# Patient Record
Sex: FEMALE | Race: WHITE | Employment: PART TIME | URBAN - METROPOLITAN AREA
[De-identification: names, ages, dates, MRNs, and addresses within clinical notes are randomized per-mention and may not be internally consistent; named-entity substitution may affect disease eponyms.]

---

## 2018-06-24 PROBLEM — R10.31 ABDOMINAL PAIN, ACUTE, RIGHT LOWER QUADRANT: Status: ACTIVE | Noted: 2018-06-24

## 2018-11-01 ENCOUNTER — OFFICE VISIT (OUTPATIENT)
Dept: ORTHOPEDIC SURGERY | Facility: CLINIC | Age: 33
End: 2018-11-01

## 2018-11-01 VITALS
WEIGHT: 128.2 LBS | SYSTOLIC BLOOD PRESSURE: 103 MMHG | TEMPERATURE: 97.3 F | DIASTOLIC BLOOD PRESSURE: 56 MMHG | HEIGHT: 65 IN | BODY MASS INDEX: 21.36 KG/M2 | HEART RATE: 78 BPM | RESPIRATION RATE: 18 BRPM | OXYGEN SATURATION: 98 %

## 2018-11-01 DIAGNOSIS — M22.41 CHONDROMALACIA PATELLAE, RIGHT: Primary | ICD-10-CM

## 2018-11-01 DIAGNOSIS — M25.561 CHRONIC PAIN OF RIGHT KNEE: ICD-10-CM

## 2018-11-01 DIAGNOSIS — M17.11 PRIMARY OSTEOARTHRITIS OF RIGHT KNEE: ICD-10-CM

## 2018-11-01 DIAGNOSIS — G89.29 CHRONIC PAIN OF RIGHT KNEE: ICD-10-CM

## 2018-11-01 RX ORDER — BETAMETHASONE SODIUM PHOSPHATE AND BETAMETHASONE ACETATE 3; 3 MG/ML; MG/ML
6 INJECTION, SUSPENSION INTRA-ARTICULAR; INTRALESIONAL; INTRAMUSCULAR; SOFT TISSUE ONCE
Qty: 0.5 ML | Refills: 0
Start: 2018-11-01 | End: 2018-11-01

## 2018-11-01 RX ORDER — BUPIVACAINE HYDROCHLORIDE 2.5 MG/ML
4 INJECTION, SOLUTION EPIDURAL; INFILTRATION; INTRACAUDAL ONCE
Qty: 4 ML | Refills: 0
Start: 2018-11-01 | End: 2018-11-01

## 2018-11-01 NOTE — PROGRESS NOTES
Patient: Lollie Frankel                MRN: [id number]       SSN: [id number] YOB: 1985        AGE: 34 y.o. SEX: female PCP: None 11/01/18 Chief Complaint Patient presents with  Knee Pain Right HISTORY:  Lollie Frankel is a 34 y.o. female who is seen for right knee pain. She was a  for the [employer] for 6.5 years. She believes wear and tear while running jumping and carrying heavy equipment in the service initiated her pain. She reports progressive anterior knee pain since 2007. She states that she was seen by a [facility] orthopedist who treated her with heavy pain medications, cortisone injections, and PT. She reports limited right knee motion. She has some pain radiating through her right leg. She was seen by the [facility] by her PCP who she reports told her to suck it up and deal with the pain. She had a previous MRI scan in her right knee while in the service that showed CMP but no meniscal tear--report unavailable. She has tried a variety of conservative measures including cortisone injections, NSAIDs, and physical therapy. She reports startup pain. She has pain ascending/descending stairs. She reports she received temporary relief from previous visco supplementation. She reports a feeling of right leg weakness. Pain Assessment  11/1/2018 Location of Pain Knee Location Modifiers Right Severity of Pain 5 Quality of Pain Aching;Dull Duration of Pain Persistent Frequency of Pain Constant Aggravating Factors Walking;Standing Limiting Behavior Yes Relieving Factors Nothing Result of Injury No  
 
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 She lives in [place] with her , Angelica Wallace, and 2yo daughter. Current weight is 128 pounds. She is 5'5\" tall.    
 
No results found for: HBA1C, HGBE8, QSH7LSDU, LSB7CHIY, NQR1UMWF 
 Weight Metrics 11/1/2018 6/24/2018 3/25/2018 7/19/2016 6/12/2012 6/9/2012 Weight 128 lb 3.2 oz 120 lb 120 lb 144 lb 118 lb 118 lb BMI 21.33 kg/m2 19.97 kg/m2 19.97 kg/m2 23.96 kg/m2 19.33 kg/m2 19.33 kg/m2 Patient Active Problem List  
Diagnosis Code  Pregnant Z34.90  Abdominal pain, acute, right lower quadrant R10.31 REVIEW OF SYSTEMS: All Below are Negative except: See HPI Constitutional: negative for fever, chills, and weight loss. Cardiovascular: negative for chest pain, claudication, leg swelling, SOB, GAGE Gastrointestinal: Negative for pain, N/V/C/D, Blood in stool or urine, dysuria,  hematuria, incontinence, pelvic pain. Musculoskeletal: See HPI Neurological: Negative for dizziness and weakness. Negative for headaches, Visual changes, confusion, seizures Phychiatric/Behavioral: Negative for depression, memory loss, substance  abuse. Extremities: Negative for hair changes, rash, or skin lesion changes. Hematologic: Negative for bleeding problems, bruising, pallor or swollen lymph  nodes Peripheral Vascular: No calf pain, no circulation deficits. Social History Socioeconomic History  Marital status:  Spouse name: Not on file  Number of children: Not on file  Years of education: Not on file  Highest education level: Not on file Social Needs  Financial resource strain: Not on file  Food insecurity - worry: Not on file  Food insecurity - inability: Not on file  Transportation needs - medical: Not on file  Transportation needs - non-medical: Not on file Occupational History  Not on file Tobacco Use  Smoking status: Never Smoker  Smokeless tobacco: Never Used Substance and Sexual Activity  Alcohol use: Yes Comment: soically  Drug use: No  
 Sexual activity: Yes  
  Partners: Male Birth control/protection: None Other Topics Concern  Not on file Social History Narrative  Not on file No Known Allergies Current Outpatient Medications Medication Sig  
 ondansetron (ZOFRAN ODT) 4 mg disintegrating tablet Take 1 Tab by mouth every four (4) hours as needed for Nausea. No current facility-administered medications for this visit. PHYSICAL EXAMINATION: 
Visit Vitals /56 Pulse 78 Temp 97.3 °F (36.3 °C) (Oral) Resp 18 Ht 5' 5\" (1.651 m) Wt 128 lb 3.2 oz (58.2 kg) SpO2 98% BMI 21.33 kg/m² ORTHO EXAMINATION: 
Examination Right knee Left knee Skin Intact Intact Range of motion 115-0 120-0 Effusion - - Medial joint line tenderness + - Lateral joint line tenderness + - Popliteal tenderness - -  
Osteophytes palpable - - Rias - - Patella crepitus + - Anterior drawer - - Lateral laxity - - Medial laxity - - Varus deformity - -  
Valgus deformity - - Pretibial edema - - Calf tenderness - - PROCEDURE:  After discussing treatment options, patient's right knee was injected with 4 cc Marcaine and 1/2 cc Celestone. Chart reviewed for the following: 
 Johan Diaz MD, have reviewed the History, Physical and updated the Allergic reactions for [facility] TIME OUT performed immediately prior to start of procedure: 
Johan Diaz MD, have performed the following reviews on [facility] prior to the start of the procedure: 
         
* Patient was identified by name and date of birth * Agreement on procedure being performed was verified * Risks and Benefits explained to the patient * Procedure site verified and marked as necessary * Patient was positioned for comfort * Consent was obtained Time: 9:34 AM  
 
Date of procedure: 11/1/2018 Procedure performed by:  Dominga Foreman MD 
 
Ms. Yoo tolerated the procedure well with no complications. RADIOGRAPHS: 
XR RIGHT KNEE 11/1/18 DARYL IMPRESSION:  Three views - No fractures, no effusion, mild joint space narrowing, small osteophytes present. IKDC Grade B IMPRESSION:   
  ICD-10-CM ICD-9-CM 1. Chondromalacia patellae, right M22.41 717.7 PROCEDURE AUTHORIZATION TO   
   betamethasone (CELESTONE SOLUSPAN) 6 mg/mL injection BETAMETHASONE ACETATE & SODIUM PHOSPHATE INJECTION 3 MG EA.  
   DRAIN/INJECT LARGE JOINT/BURSA  
   bupivacaine, PF, (MARCAINE, PF,) 0.25 % (2.5 mg/mL) injection MRI KNEE RT WO CONT 2. Chronic pain of right knee M25.561 719.46 AMB POC X-RAY KNEE 3 VIEW  
 G89.29 338.29 PROCEDURE AUTHORIZATION TO   
   betamethasone (CELESTONE SOLUSPAN) 6 mg/mL injection BETAMETHASONE ACETATE & SODIUM PHOSPHATE INJECTION 3 MG EA.  
   DRAIN/INJECT LARGE JOINT/BURSA  
   bupivacaine, PF, (MARCAINE, PF,) 0.25 % (2.5 mg/mL) injection MRI KNEE RT WO CONT 3. Primary osteoarthritis of right knee M17.11 715.16 PROCEDURE AUTHORIZATION TO   
   betamethasone (CELESTONE SOLUSPAN) 6 mg/mL injection BETAMETHASONE ACETATE & SODIUM PHOSPHATE INJECTION 3 MG EA.  
   DRAIN/INJECT LARGE JOINT/BURSA  
   bupivacaine, PF, (MARCAINE, PF,) 0.25 % (2.5 mg/mL) injection MRI KNEE RT WO CONT PLAN:  After discussing treatment options, patient's right knee was injected with 4 cc Marcaine and 1/2 cc Celestone. Right knee MRI ordered. Consider visco supplementation if pain continues. She will follow up with the results of her MRI. There is no need for surgery at this time.   
 
Scribed by Katelyn Galarza ([address]) as dictated by Ludivina Marte MD

## 2018-11-23 ENCOUNTER — HOSPITAL ENCOUNTER (OUTPATIENT)
Dept: MRI IMAGING | Age: 33
Discharge: HOME | End: 2018-11-23
Attending: SPECIALIST
Payer: MEDICAID

## 2018-11-23 DIAGNOSIS — M25.561 CHRONIC PAIN OF RIGHT KNEE: ICD-10-CM

## 2018-11-23 DIAGNOSIS — M22.41 CHONDROMALACIA PATELLAE, RIGHT: ICD-10-CM

## 2018-11-23 DIAGNOSIS — G89.29 CHRONIC PAIN OF RIGHT KNEE: ICD-10-CM

## 2018-11-23 DIAGNOSIS — M17.11 PRIMARY OSTEOARTHRITIS OF RIGHT KNEE: ICD-10-CM

## 2018-11-23 PROCEDURE — 73721 MRI JNT OF LWR EXTRE W/O DYE: CPT

## 2019-09-12 PROBLEM — Z34.90 ENCOUNTER FOR ELECTIVE INDUCTION OF LABOR: Status: ACTIVE | Noted: 2019-09-12

## 2022-03-18 PROBLEM — Z34.90 ENCOUNTER FOR ELECTIVE INDUCTION OF LABOR: Status: ACTIVE | Noted: 2019-09-12

## 2022-03-20 PROBLEM — R10.31 ABDOMINAL PAIN, ACUTE, RIGHT LOWER QUADRANT: Status: ACTIVE | Noted: 2018-06-24

## 2022-12-13 ENCOUNTER — TELEPHONE (OUTPATIENT)
Dept: FAMILY MEDICINE CLINIC | Age: 37
End: 2022-12-13

## 2022-12-13 NOTE — TELEPHONE ENCOUNTER
----- Message from Woodson Curling sent at 12/12/2022  3:01 PM EST -----  Regarding: RE: Needs appt  Dr. Alexander Gallego is all booked up until April.   ----- Message -----  From: Sreekanth Rogel  Sent: 12/12/2022   2:52 PM EST  To: Braeden Carr  Subject: Needs appt                                       Pt lives in [place]. Wants someone to see her and her children. Message  Received: Today  Keke MEDINA [facility] Front Office  Subject: Appointment Request     Reason for Call: New Patient/New to Provider Appointment needed: New   Patient Request Appointment     QUESTIONS     Reason for appointment request? No appointments available during search       Additional Information for Provider?  Patient and her two children are   looking to establish care with a provider at the practice   ---------------------------------------------------------------------------   --------------   [id number]   [phone]; OK to leave message on voicemail   ---------------------------------------------------------------------------   --------------   SCRIPT ANSWERS   COVID Screen: Lulu Ellsworth

## 2023-02-22 ENCOUNTER — OFFICE VISIT (OUTPATIENT)
Facility: CLINIC | Age: 38
End: 2023-02-22
Payer: COMMERCIAL

## 2023-02-22 VITALS
HEIGHT: 65 IN | BODY MASS INDEX: 19.19 KG/M2 | DIASTOLIC BLOOD PRESSURE: 65 MMHG | HEART RATE: 70 BPM | OXYGEN SATURATION: 98 % | TEMPERATURE: 98.7 F | SYSTOLIC BLOOD PRESSURE: 95 MMHG | WEIGHT: 115.2 LBS

## 2023-02-22 DIAGNOSIS — Z90.710 S/P TOTAL HYSTERECTOMY: ICD-10-CM

## 2023-02-22 DIAGNOSIS — M17.11 PRIMARY OSTEOARTHRITIS OF RIGHT KNEE: ICD-10-CM

## 2023-02-22 DIAGNOSIS — Z00.00 HEALTHCARE MAINTENANCE: Primary | ICD-10-CM

## 2023-02-22 PROBLEM — R10.31 ABDOMINAL PAIN, ACUTE, RIGHT LOWER QUADRANT: Status: RESOLVED | Noted: 2018-06-24 | Resolved: 2023-02-22

## 2023-02-22 PROBLEM — Z34.90 ENCOUNTER FOR ELECTIVE INDUCTION OF LABOR: Status: RESOLVED | Noted: 2019-09-12 | Resolved: 2023-02-22

## 2023-02-22 PROCEDURE — 99395 PREV VISIT EST AGE 18-39: CPT | Performed by: STUDENT IN AN ORGANIZED HEALTH CARE EDUCATION/TRAINING PROGRAM

## 2023-02-22 SDOH — ECONOMIC STABILITY: FOOD INSECURITY: WITHIN THE PAST 12 MONTHS, YOU WORRIED THAT YOUR FOOD WOULD RUN OUT BEFORE YOU GOT MONEY TO BUY MORE.: NEVER TRUE

## 2023-02-22 SDOH — ECONOMIC STABILITY: INCOME INSECURITY: HOW HARD IS IT FOR YOU TO PAY FOR THE VERY BASICS LIKE FOOD, HOUSING, MEDICAL CARE, AND HEATING?: NOT HARD AT ALL

## 2023-02-22 SDOH — ECONOMIC STABILITY: HOUSING INSECURITY
IN THE LAST 12 MONTHS, WAS THERE A TIME WHEN YOU DID NOT HAVE A STEADY PLACE TO SLEEP OR SLEPT IN A SHELTER (INCLUDING NOW)?: NO

## 2023-02-22 SDOH — ECONOMIC STABILITY: FOOD INSECURITY: WITHIN THE PAST 12 MONTHS, THE FOOD YOU BOUGHT JUST DIDN'T LAST AND YOU DIDN'T HAVE MONEY TO GET MORE.: NEVER TRUE

## 2023-02-22 ASSESSMENT — PATIENT HEALTH QUESTIONNAIRE - PHQ9
SUM OF ALL RESPONSES TO PHQ9 QUESTIONS 1 & 2: 1
SUM OF ALL RESPONSES TO PHQ QUESTIONS 1-9: 1
1. LITTLE INTEREST OR PLEASURE IN DOING THINGS: 0
SUM OF ALL RESPONSES TO PHQ QUESTIONS 1-9: 1
2. FEELING DOWN, DEPRESSED OR HOPELESS: 1
SUM OF ALL RESPONSES TO PHQ QUESTIONS 1-9: 1
SUM OF ALL RESPONSES TO PHQ QUESTIONS 1-9: 1

## 2023-02-22 NOTE — PROGRESS NOTES
Subjective:   Cassi Varela is a 30 y.o. female who was seen for Establish Care    Here to establish care with new physician. Reports that she just moved from [place]. Has served in the [employer]. Had arthroscopy right knee surgery 2021. Had torn meniscus found during surgery. Her leg does lock up. Reports that she has hesitancy with the knee. Prior to Admission medications    Not on File     No Known Allergies  Social History     Tobacco Use    Smoking status: Never    Smokeless tobacco: Never   Vaping Use    Vaping Use: Never used   Substance Use Topics    Alcohol use: Not Currently    Drug use: No        Review of Systems   As noted above in HPI. Objective:   BP 95/65   Pulse 70   Temp 98.7 °F (37.1 °C) (Temporal)   Ht 5' 5\" (1.651 m)   Wt 115 lb 3.2 oz (52.3 kg)   SpO2 98%   BMI 19.17 kg/m²      General: alert, oriented, not in distress  Chest/Lungs: clear breath sounds, no wheezing or crackles  Heart: normal rate, regular rhythm, no murmur  Extremities: no focal deformities, no edema  Skin: no active skin lesions    11/23/2018 MRI knee right wo cont:  1. Mild distal quadriceps and proximal patellar insertional tendinosis. There is   also subtle edema within the suprapatellar fat pad. Findings are suggestive of   patellar impingement; no morphology to predispose patellar maltracking. 2. No additional findings of internal derangement otherwise in the right knee. No significant degenerative changes. Assessment & Plan:     Healthcare maintenance  -     Basic Metabolic Panel; Future  -     CBC; Future  -     Lipid Panel; Future  -     Hepatitis C Antibody; Future  Primary osteoarthritis of right knee  Comments:  Does see an ortho doc at the [place]. Reprots that she would like a second opinion on her right knee issues as it is getting worse.   Orders:  -     James Sharma MD, Orthopedic Surgery(General/Total Joint), [facility]O [facility] Dr) Lyndee Galeazzi total hysterectomy  Comments:  no need for cervical cancer screening. No follow-up provider specified. I have discussed the diagnosis with the patient and the intended plan as seen in the above orders. The patient has received an after-visit summary and questions were answered concerning future plans. I have discussed medication side effects and warnings with the patient as well. I have reviewed the plan of care with the patient, accepted their input and they are in agreement with the treatment goals. Previous lab and imaging results were reviewed by me.        Nicole Vee MD  February 22, 2023

## 2023-02-22 NOTE — PROGRESS NOTES
Zena Guzmán presents today for   Chief Complaint   Patient presents with    Establish Care       Is someone accompanying this pt? No     Is the patient using any DME equipment during OV? No     Health Maintenance reviewed and discussed and ordered per Provider. Health Maintenance Due   Topic Date Due    Varicella vaccine (1 of 2 - 2-dose childhood series) Never done    Depression Screen  Never done    Hepatitis C screen  Never done    Cervical cancer screen  Never done    COVID-19 Vaccine (3 - Booster for Pfizer series) 07/23/2021    Flu vaccine (1) 08/01/2022   . Coordination of Care:  1. \"Have you been to the ER, urgent care clinic since your last visit? Hospitalized since your last visit? \" No    2. \"Have you seen or consulted any other health care providers outside of the [address] [place] [address] since your last visit? \" No    3. For patients aged 39-70: Has the patient had a colonoscopy? No    If the patient is female:    4. For patients aged 41-77: Has the patient had a mammogram within the past 2 years? No     5. For patients aged 21-65: Has the patient had a pap smear?  No patient had hysterectomy

## 2024-02-05 ENCOUNTER — HOSPITAL ENCOUNTER (EMERGENCY)
Age: 39
Discharge: HOME | End: 2024-02-05
Attending: EMERGENCY MEDICINE
Payer: MEDICAID

## 2024-02-05 VITALS
RESPIRATION RATE: 14 BRPM | SYSTOLIC BLOOD PRESSURE: 118 MMHG | HEIGHT: 65 IN | OXYGEN SATURATION: 99 % | DIASTOLIC BLOOD PRESSURE: 59 MMHG | TEMPERATURE: 98.2 F | WEIGHT: 130 LBS | HEART RATE: 71 BPM | BODY MASS INDEX: 21.66 KG/M2

## 2024-02-05 DIAGNOSIS — K52.9 GASTROENTERITIS: Primary | ICD-10-CM

## 2024-02-05 LAB
ALBUMIN SERPL-MCNC: 3.9 G/DL (ref 3.4–5)
ALBUMIN/GLOB SERPL: 1 (ref 0.8–1.7)
ALP SERPL-CCNC: 63 U/L (ref 45–117)
ALT SERPL-CCNC: 18 U/L (ref 13–56)
ANION GAP SERPL CALC-SCNC: 5 MMOL/L (ref 3–18)
AST SERPL W P-5'-P-CCNC: 15 U/L (ref 10–38)
BASOPHILS # BLD: 0.1 K/UL (ref 0–0.1)
BASOPHILS NFR BLD: 1 % (ref 0–2)
BILIRUB SERPL-MCNC: 0.9 MG/DL (ref 0.2–1)
BUN SERPL-MCNC: 16 MG/DL (ref 7–18)
BUN/CREAT SERPL: 22 (ref 12–20)
CA-I BLD-MCNC: 9.2 MG/DL (ref 8.5–10.1)
CHLORIDE SERPL-SCNC: 108 MMOL/L (ref 100–111)
CO2 SERPL-SCNC: 28 MMOL/L (ref 21–32)
CREAT SERPL-MCNC: 0.74 MG/DL (ref 0.6–1.3)
DIFFERENTIAL METHOD BLD: ABNORMAL
EOSINOPHIL # BLD: 0.1 K/UL (ref 0–0.4)
EOSINOPHIL NFR BLD: 1 % (ref 0–5)
ERYTHROCYTE [DISTWIDTH] IN BLOOD BY AUTOMATED COUNT: 12.8 % (ref 11.6–14.5)
GLOBULIN SER CALC-MCNC: 3.8 G/DL (ref 2–4)
GLUCOSE SERPL-MCNC: 99 MG/DL (ref 74–99)
HCT VFR BLD AUTO: 43.4 % (ref 35–45)
HGB BLD-MCNC: 14.3 G/DL (ref 12–16)
IMM GRANULOCYTES # BLD AUTO: 0 K/UL (ref 0–0.04)
IMM GRANULOCYTES NFR BLD AUTO: 0 % (ref 0–0.5)
LIPASE SERPL-CCNC: 22 U/L (ref 13–75)
LYMPHOCYTES # BLD: 0.6 K/UL (ref 0.9–3.6)
LYMPHOCYTES NFR BLD: 5 % (ref 21–52)
MAGNESIUM SERPL-MCNC: 2.2 MG/DL (ref 1.6–2.6)
MCH RBC QN AUTO: 30.5 PG (ref 24–34)
MCHC RBC AUTO-ENTMCNC: 32.9 G/DL (ref 31–37)
MCV RBC AUTO: 92.5 FL (ref 78–100)
MONOCYTES # BLD: 0.7 K/UL (ref 0.05–1.2)
MONOCYTES NFR BLD: 6 % (ref 3–10)
NEUTS SEG # BLD: 9 K/UL (ref 1.8–8)
NEUTS SEG NFR BLD: 87 % (ref 40–73)
NRBC # BLD: 0 K/UL (ref 0–0.01)
NRBC BLD-RTO: 0 PER 100 WBC
PLATELET # BLD AUTO: 191 K/UL (ref 135–420)
PMV BLD AUTO: 11.9 FL (ref 9.2–11.8)
POTASSIUM SERPL-SCNC: 4 MMOL/L (ref 3.5–5.5)
PROT SERPL-MCNC: 7.7 G/DL (ref 6.4–8.2)
RBC # BLD AUTO: 4.69 M/UL (ref 4.2–5.3)
SODIUM SERPL-SCNC: 141 MMOL/L (ref 136–145)
WBC # BLD AUTO: 10.4 K/UL (ref 4.6–13.2)

## 2024-02-05 PROCEDURE — 99284 EMERGENCY DEPT VISIT MOD MDM: CPT

## 2024-02-05 PROCEDURE — 96374 THER/PROPH/DIAG INJ IV PUSH: CPT

## 2024-02-05 PROCEDURE — 2580000003 HC RX 258: Performed by: EMERGENCY MEDICINE

## 2024-02-05 PROCEDURE — 85025 COMPLETE CBC W/AUTO DIFF WBC: CPT

## 2024-02-05 PROCEDURE — 83735 ASSAY OF MAGNESIUM: CPT

## 2024-02-05 PROCEDURE — 96361 HYDRATE IV INFUSION ADD-ON: CPT

## 2024-02-05 PROCEDURE — 83690 ASSAY OF LIPASE: CPT

## 2024-02-05 PROCEDURE — 36415 COLL VENOUS BLD VENIPUNCTURE: CPT

## 2024-02-05 PROCEDURE — 6360000002 HC RX W HCPCS: Performed by: EMERGENCY MEDICINE

## 2024-02-05 PROCEDURE — 80053 COMPREHEN METABOLIC PANEL: CPT

## 2024-02-05 RX ORDER — ONDANSETRON 2 MG/ML
4 INJECTION INTRAMUSCULAR; INTRAVENOUS ONCE
Status: COMPLETED | OUTPATIENT
Start: 2024-02-05 | End: 2024-02-05

## 2024-02-05 RX ORDER — DICYCLOMINE HCL 20 MG
20 TABLET ORAL
Qty: 15 TABLET | Refills: 0 | Status: SHIPPED | OUTPATIENT
Start: 2024-02-05

## 2024-02-05 RX ORDER — SODIUM CHLORIDE, SODIUM LACTATE, POTASSIUM CHLORIDE, AND CALCIUM CHLORIDE .6; .31; .03; .02 G/100ML; G/100ML; G/100ML; G/100ML
1000 INJECTION, SOLUTION INTRAVENOUS ONCE
Status: COMPLETED | OUTPATIENT
Start: 2024-02-05 | End: 2024-02-05

## 2024-02-05 RX ORDER — ONDANSETRON 4 MG/1
4 TABLET, FILM COATED ORAL 3 TIMES DAILY PRN
Qty: 15 TABLET | Refills: 0 | Status: SHIPPED | OUTPATIENT
Start: 2024-02-05

## 2024-02-05 RX ADMIN — ONDANSETRON 4 MG: 2 INJECTION INTRAMUSCULAR; INTRAVENOUS at 09:36

## 2024-02-05 RX ADMIN — SODIUM CHLORIDE, POTASSIUM CHLORIDE, SODIUM LACTATE AND CALCIUM CHLORIDE 1000 ML: 600; 310; 30; 20 INJECTION, SOLUTION INTRAVENOUS at 09:36

## 2024-02-05 NOTE — ED TRIAGE NOTES
Reports nausea since Saturday. 2-3 episodes of vomiting and as many episodes of diarrhea since Saturday.

## 2024-02-05 NOTE — DISCHARGE INSTRUCTIONS
You had blood testing today that was reassuring.  Very likely you have a mild viral gastroenteritis versus food toxicity that should be improved over the next 1 or 2 days.  I have prescribed Zofran for any nausea, recommending that you  some Imodium from the drugstore to slow the diarrhea so that you can rehydrate appropriately.  You may also consider taking a probiotic for the next 1 week to replace the good bacteria in your intestines.  Please follow-up with your primary care provider for reassessment at the [facility].

## 2024-02-28 ENCOUNTER — OFFICE VISIT (OUTPATIENT)
Facility: CLINIC | Age: 39
End: 2024-02-28
Payer: MEDICAID

## 2024-02-28 VITALS
WEIGHT: 119 LBS | HEART RATE: 69 BPM | BODY MASS INDEX: 19.83 KG/M2 | HEIGHT: 65 IN | DIASTOLIC BLOOD PRESSURE: 62 MMHG | TEMPERATURE: 97.4 F | OXYGEN SATURATION: 97 % | SYSTOLIC BLOOD PRESSURE: 100 MMHG | RESPIRATION RATE: 18 BRPM

## 2024-02-28 DIAGNOSIS — Z90.710 S/P TOTAL HYSTERECTOMY: ICD-10-CM

## 2024-02-28 DIAGNOSIS — Z00.00 ENCOUNTER FOR WELL ADULT EXAM WITHOUT ABNORMAL FINDINGS: Primary | ICD-10-CM

## 2024-02-28 PROCEDURE — 99395 PREV VISIT EST AGE 18-39: CPT | Performed by: STUDENT IN AN ORGANIZED HEALTH CARE EDUCATION/TRAINING PROGRAM

## 2024-02-28 SDOH — ECONOMIC STABILITY: FOOD INSECURITY: WITHIN THE PAST 12 MONTHS, YOU WORRIED THAT YOUR FOOD WOULD RUN OUT BEFORE YOU GOT MONEY TO BUY MORE.: NEVER TRUE

## 2024-02-28 SDOH — ECONOMIC STABILITY: FOOD INSECURITY: WITHIN THE PAST 12 MONTHS, THE FOOD YOU BOUGHT JUST DIDN'T LAST AND YOU DIDN'T HAVE MONEY TO GET MORE.: NEVER TRUE

## 2024-02-28 ASSESSMENT — PATIENT HEALTH QUESTIONNAIRE - PHQ9
SUM OF ALL RESPONSES TO PHQ QUESTIONS 1-9: 0
1. LITTLE INTEREST OR PLEASURE IN DOING THINGS: 0
SUM OF ALL RESPONSES TO PHQ9 QUESTIONS 1 & 2: 0
SUM OF ALL RESPONSES TO PHQ QUESTIONS 1-9: 0
2. FEELING DOWN, DEPRESSED OR HOPELESS: 0
SUM OF ALL RESPONSES TO PHQ QUESTIONS 1-9: 0
SUM OF ALL RESPONSES TO PHQ QUESTIONS 1-9: 0

## 2024-02-28 NOTE — PATIENT INSTRUCTIONS
hands, brush your teeth twice a day, and wear a seat belt in the car.   Where can you learn more?  Go to https://[email or web address].net/patientEd and enter P072 to learn more about \"Well Visit, Ages 18 to 65: Care Instructions.\"  Current as of: August 6, 2023               Content Version: 13.9  © 5323-3401 Healthwise, [employer].   Care instructions adapted under license by [employer]. If you have questions about a medical condition or this instruction, always ask your healthcare professional. Healthwise, [employer] disclaims any warranty or liability for your use of this information.

## 2024-02-28 NOTE — PROGRESS NOTES
Well Adult Note  Name: Patti Dong Today’s Date: 2024   MRN: [id number] Sex: Female   Age: 38 y.o. Ethnicity: Non- / Non    : 1985 Race: White (non-)      Patti Dong is here for well adult exam.  History:  Patient's been doing well.  Has an appointment to see knee specialist in a few months.  She had an episode of gastroenteritis earlier this month which is since resolved after 48 hours.  Reports otherwise she is doing well.  Is taking allergy medication given recent weather change.  Declines flu vaccine.        No Known Allergies      Prior to Visit Medications    Not on File         Past Medical History:   Diagnosis Date    Abnormal Papanicolaou smear of cervix     cleared in [employer]    Anemia        Past Surgical History:   Procedure Laterality Date    HEENT      wisdom teeth removal    HEENT      tonsils    HYSTERECTOMY, TOTAL ABDOMINAL (CERVIX REMOVED)  2020    Parital, ovaries still remain    OTHER SURGICAL HISTORY      Tonsillectomy         Family History   Problem Relation Age of Onset    Ovarian Cancer Mother     Heart Attack Mother     Cancer Maternal Grandmother        Social History     Tobacco Use    Smoking status: Never    Smokeless tobacco: Never   Vaping Use    Vaping Use: Never used   Substance Use Topics    Alcohol use: Not Currently     Comment: socially    Drug use: No       Objective     Vital Signs  /62 (Site: Left Upper Arm, Position: Sitting, Cuff Size: Medium Adult)   Pulse 69   Temp 97.4 °F (36.3 °C) (Temporal)   Resp 18   Ht 1.651 m (5' 5\")   Wt 54 kg (119 lb)   SpO2 97%   BMI 19.80 kg/m²   Wt Readings from Last 3 Encounters:   24 54 kg (119 lb)   24 59 kg (130 lb)   23 52.3 kg (115 lb 3.2 oz)       Waist Circumference  There were no vitals filed for this visit.    Physical Exam  Vitals and nursing note reviewed.   Constitutional:       Appearance: She is not ill-appearing.   HENT:      Head:

## 2024-02-28 NOTE — PROGRESS NOTES
Patti Dong presents today for   Chief Complaint   Patient presents with    Annual Exam     Yearly follow up        Is someone accompanying this pt? Yes-son    Is the patient using any DME equipment during OV? no    Health Maintenance reviewed and discussed and ordered per Provider.    Health Maintenance Due   Topic Date Due    Varicella vaccine (1 of 2 - 2-dose childhood series) Never done    Hepatitis C screen  Never done    Polio vaccine (2 of 3 - Adult catch-up series) 08/21/2006    Flu vaccine (1) 08/01/2023    COVID-19 Vaccine (3 - 2023-24 season) 09/01/2023    Depression Screen  02/22/2024   .      \"Have you been to the ER, urgent care clinic since your last visit?  Hospitalized since your last visit?\"    YES - When: approximately 2  weeks ago.  Where and Why: stomach bug around valentines day.    “Have you seen or consulted any other health care providers outside of [facility] since your last visit?”    NO

## 2024-06-10 ENCOUNTER — HOSPITAL ENCOUNTER (EMERGENCY)
Age: 39
Discharge: HOME | End: 2024-06-10
Attending: FAMILY MEDICINE
Payer: MEDICAID

## 2024-06-10 ENCOUNTER — APPOINTMENT (OUTPATIENT)
Age: 39
End: 2024-06-10
Payer: MEDICAID

## 2024-06-10 VITALS
HEIGHT: 65 IN | HEART RATE: 71 BPM | SYSTOLIC BLOOD PRESSURE: 115 MMHG | DIASTOLIC BLOOD PRESSURE: 80 MMHG | RESPIRATION RATE: 17 BRPM | TEMPERATURE: 99.1 F | WEIGHT: 120 LBS | BODY MASS INDEX: 19.99 KG/M2 | OXYGEN SATURATION: 98 %

## 2024-06-10 DIAGNOSIS — M94.0 COSTOCHONDRITIS: Primary | ICD-10-CM

## 2024-06-10 PROCEDURE — 71046 X-RAY EXAM CHEST 2 VIEWS: CPT

## 2024-06-10 PROCEDURE — 6360000002 HC RX W HCPCS: Performed by: FAMILY MEDICINE

## 2024-06-10 PROCEDURE — 96372 THER/PROPH/DIAG INJ SC/IM: CPT

## 2024-06-10 PROCEDURE — 99284 EMERGENCY DEPT VISIT MOD MDM: CPT

## 2024-06-10 RX ORDER — METHYLPREDNISOLONE 4 MG/1
TABLET ORAL
Qty: 1 KIT | Refills: 0 | Status: SHIPPED | OUTPATIENT
Start: 2024-06-10 | End: 2024-06-16

## 2024-06-10 RX ORDER — IBUPROFEN 800 MG/1
800 TABLET ORAL EVERY 8 HOURS PRN
Qty: 20 TABLET | Refills: 0 | Status: SHIPPED | OUTPATIENT
Start: 2024-06-10

## 2024-06-10 RX ORDER — KETOROLAC TROMETHAMINE 30 MG/ML
30 INJECTION, SOLUTION INTRAMUSCULAR; INTRAVENOUS ONCE
Status: COMPLETED | OUTPATIENT
Start: 2024-06-10 | End: 2024-06-10

## 2024-06-10 RX ORDER — CYCLOBENZAPRINE HCL 10 MG
10 TABLET ORAL 3 TIMES DAILY PRN
Qty: 21 TABLET | Refills: 0 | Status: SHIPPED | OUTPATIENT
Start: 2024-06-10 | End: 2024-06-20

## 2024-06-10 RX ADMIN — KETOROLAC TROMETHAMINE 30 MG: 30 INJECTION, SOLUTION INTRAMUSCULAR at 19:48

## 2024-06-10 ASSESSMENT — PAIN SCALES - GENERAL
PAINLEVEL_OUTOF10: 8
PAINLEVEL_OUTOF10: 7

## 2024-06-10 ASSESSMENT — PAIN DESCRIPTION - LOCATION
LOCATION: RIB CAGE
LOCATION: RIB CAGE

## 2024-06-10 ASSESSMENT — LIFESTYLE VARIABLES
HOW MANY STANDARD DRINKS CONTAINING ALCOHOL DO YOU HAVE ON A TYPICAL DAY: PATIENT DOES NOT DRINK
HOW OFTEN DO YOU HAVE A DRINK CONTAINING ALCOHOL: NEVER

## 2024-06-10 ASSESSMENT — PAIN DESCRIPTION - ORIENTATION: ORIENTATION: RIGHT;LEFT

## 2024-06-10 ASSESSMENT — PAIN - FUNCTIONAL ASSESSMENT: PAIN_FUNCTIONAL_ASSESSMENT: 0-10

## 2024-06-10 NOTE — ED PROVIDER NOTES
EMERGENCY DEPARTMENT HISTORY AND PHYSICAL EXAM      Patient Name: Patti Dong  MRN: [id number]  YOB: 1985  Provider: Jany Kemp DO  PCP: Jeremi Mcmanus MD     History of Presenting Illness     Chief Complaint   Patient presents with    Abdominal Pain    Rib Pain (injury)       History Provided By: Patient     HPI: Patti Dong, 38 y.o. female  presents to the ED with cc of rib cage pain. States she had cold symptoms about 2 or 3 days ago, had congestion and a lot of phlegm. Now her lower ribs hurt when she moves or takes a deep breath. No HA, numbness, peripheral edema, rash, abdominal pain, dysuria. Had hysterectomy - no menstrual periods anymore. Pain is worse with deep breath and palpation, lower ribs anterior and laterally.     Past History     Past Medical History:  Past Medical History:   Diagnosis Date    Abnormal Papanicolaou smear of cervix 2011    cleared in [employer]    Anemia        Past Surgical History:  Past Surgical History:   Procedure Laterality Date    HEENT      wisdom teeth removal    HEENT      tonsils    HYSTERECTOMY, TOTAL ABDOMINAL (CERVIX REMOVED)  February 2020    Parital, ovaries still remain    OTHER SURGICAL HISTORY  2005    Tonsillectomy       Medications:  No current facility-administered medications for this encounter.     Current Outpatient Medications   Medication Sig Dispense Refill    methylPREDNISolone (MEDROL, LEDY,) 4 MG tablet Take by mouth. 1 kit 0    cyclobenzaprine (FLEXERIL) 10 MG tablet Take 1 tablet by mouth 3 times daily as needed for Muscle spasms 21 tablet 0    ibuprofen (ADVIL;MOTRIN) 800 MG tablet Take 1 tablet by mouth every 8 hours as needed for Pain 20 tablet 0       Social History:  Social History     Tobacco Use    Smoking status: Never    Smokeless tobacco: Never   Vaping Use    Vaping Use: Never used   Substance Use Topics    Alcohol use: Not Currently     Comment: socially    Drug use: No       Allergies:  No Known

## 2024-10-16 SDOH — HEALTH STABILITY: PHYSICAL HEALTH: ON AVERAGE, HOW MANY DAYS PER WEEK DO YOU ENGAGE IN MODERATE TO STRENUOUS EXERCISE (LIKE A BRISK WALK)?: 3 DAYS

## 2024-10-17 ENCOUNTER — OFFICE VISIT (OUTPATIENT)
Facility: CLINIC | Age: 39
End: 2024-10-17
Payer: MEDICAID

## 2024-10-17 VITALS
HEIGHT: 65 IN | HEART RATE: 70 BPM | BODY MASS INDEX: 20.62 KG/M2 | DIASTOLIC BLOOD PRESSURE: 60 MMHG | WEIGHT: 123.8 LBS | RESPIRATION RATE: 17 BRPM | TEMPERATURE: 97.4 F | OXYGEN SATURATION: 97 % | SYSTOLIC BLOOD PRESSURE: 91 MMHG

## 2024-10-17 DIAGNOSIS — R04.2 HEMOPTYSIS: ICD-10-CM

## 2024-10-17 DIAGNOSIS — Z90.710 S/P TOTAL HYSTERECTOMY: Primary | ICD-10-CM

## 2024-10-17 DIAGNOSIS — Z85.41 HISTORY OF CERVICAL CANCER: ICD-10-CM

## 2024-10-17 DIAGNOSIS — M17.11 PRIMARY OSTEOARTHRITIS OF RIGHT KNEE: ICD-10-CM

## 2024-10-17 PROBLEM — Z77.29 EXPOSURE TO POTENTIALLY HAZARDOUS SUBSTANCE: Status: ACTIVE | Noted: 2023-01-09

## 2024-10-17 PROCEDURE — 99214 OFFICE O/P EST MOD 30 MIN: CPT | Performed by: STUDENT IN AN ORGANIZED HEALTH CARE EDUCATION/TRAINING PROGRAM

## 2024-10-17 NOTE — PROGRESS NOTES
Chronic Disease Follow up    Patti Dong (: 1985) is a 39 y.o. female here for evaluation of the following chief concerns(s):  New Patient (Patient is here to establish care with provider. Former [facility] patient.)       ASSESSMENT/PLAN:  1. S/P total hysterectomy  2. Primary osteoarthritis of right knee  3. Hemoptysis  -     CTA CHEST W WO CONTRAST; Future  4. History of cervical cancer    Orders Placed This Encounter   Procedures    CTA CHEST W WO CONTRAST     Standing Status:   Future     Standing Expiration Date:   10/17/2025     Order Specific Question:   Additional Contrast?     Answer:   None     Order Specific Question:   STAT Creatinine as needed:     Answer:   Yes     Hemoptysis- check CTA. Possible pulmonology referral.     H/O cervical cancer- s/p hysterectomy. Stable. Follows with GYN.     R knee arthritis- considering surgery. [facility]    Return in about 1 year (around 10/17/2025).    Patient agrees with plan as above and has no additional questions at this time.     SUBJECTIVE/OBJECTIVE:    Patient presents for follow up chronic disease, EOC. This is a new patient to me, was previously following with Dr. Mcmanus.  Histories have been reviewed and updated per chart below.    She complains of coughing up blood tinged sputum. This is intermittent- not often. Every couple of weeks/months. Has been going on for quite some time. No chest pain or SOB. High risk because of toxic exposure from the . No h/o blood clots    H/O cervical cancer     Partial hysterectomy - ovaries left   Thought to be related to toxic exposure-   [facility]   No chemo or radiation  No family history    R knee pain, osteoarthritis   Bone on bone arthritis per patient  [facility]  Has had cortisone, gel injections- now getting PRP, helping minimally   Likely needs knee replacement    HM/Social   Never smoker  Previously

## 2024-10-17 NOTE — PROGRESS NOTES
Patti Dong presents today for   Chief Complaint   Patient presents with    New Patient     Patient is here to establish care with provider. Former [facility] patient.       Is someone accompanying this pt? no    Is the patient using any DME equipment during OV? no    Health Maintenance Due   Topic Date Due    Varicella vaccine (1 of 2 - 13+ 2-dose series) Never done    Hepatitis C screen  Never done    Polio vaccine (2 of 3 - Adult catch-up series) 08/21/2006    Flu vaccine (1) 08/01/2024    COVID-19 Vaccine (3 - 2023-24 season) 09/01/2024       \"Have you been to the ER, urgent care clinic since your last visit?  Hospitalized since your last visit?\"    YES - When: approximately 8 months ago.  Where and Why: [facility] for abdominal pain.    “Have you seen or consulted any other health care providers outside of [facility] since your last visit?”    YES - When: approximately 3 months ago.  Where and Why: [facility].

## 2024-10-28 ENCOUNTER — HOSPITAL ENCOUNTER (OUTPATIENT)
Age: 39
Discharge: HOME | End: 2024-10-31
Attending: STUDENT IN AN ORGANIZED HEALTH CARE EDUCATION/TRAINING PROGRAM
Payer: MEDICAID

## 2024-10-28 DIAGNOSIS — R04.2 HEMOPTYSIS: ICD-10-CM

## 2024-10-28 PROCEDURE — 71275 CT ANGIOGRAPHY CHEST: CPT

## 2024-10-28 PROCEDURE — 6360000004 HC RX CONTRAST MEDICATION: Performed by: STUDENT IN AN ORGANIZED HEALTH CARE EDUCATION/TRAINING PROGRAM

## 2024-10-28 RX ORDER — IOPAMIDOL 755 MG/ML
99 INJECTION, SOLUTION INTRAVASCULAR
Status: COMPLETED | OUTPATIENT
Start: 2024-10-28 | End: 2024-10-28

## 2024-10-28 RX ADMIN — IOPAMIDOL 99 ML: 755 INJECTION, SOLUTION INTRAVENOUS at 08:48

## 2024-10-29 ENCOUNTER — PATIENT MESSAGE (OUTPATIENT)
Facility: CLINIC | Age: 39
End: 2024-10-29

## 2024-10-29 DIAGNOSIS — R04.2 HEMOPTYSIS: Primary | ICD-10-CM

## 2024-12-11 ENCOUNTER — APPOINTMENT (OUTPATIENT)
Age: 39
End: 2024-12-11
Payer: MEDICAID

## 2024-12-11 ENCOUNTER — HOSPITAL ENCOUNTER (EMERGENCY)
Age: 39
Discharge: HOME | End: 2024-12-11
Attending: EMERGENCY MEDICINE
Payer: MEDICAID

## 2024-12-11 VITALS
WEIGHT: 126 LBS | BODY MASS INDEX: 20.99 KG/M2 | TEMPERATURE: 98.7 F | HEIGHT: 65 IN | OXYGEN SATURATION: 97 % | RESPIRATION RATE: 18 BRPM | HEART RATE: 71 BPM | DIASTOLIC BLOOD PRESSURE: 62 MMHG | SYSTOLIC BLOOD PRESSURE: 97 MMHG

## 2024-12-11 DIAGNOSIS — M54.6 ACUTE BILATERAL THORACIC BACK PAIN: Primary | ICD-10-CM

## 2024-12-11 LAB
ALBUMIN SERPL-MCNC: 3.6 G/DL (ref 3.4–5)
ALBUMIN/GLOB SERPL: 0.9 (ref 0.8–1.7)
ALP SERPL-CCNC: 55 U/L (ref 45–117)
ALT SERPL-CCNC: 18 U/L (ref 13–56)
ANION GAP SERPL CALC-SCNC: 7 MMOL/L (ref 3–18)
AST SERPL W P-5'-P-CCNC: 13 U/L (ref 10–38)
BASOPHILS # BLD: 0 K/UL (ref 0–0.1)
BASOPHILS NFR BLD: 0 % (ref 0–2)
BILIRUB SERPL-MCNC: 1.3 MG/DL (ref 0.2–1)
BUN SERPL-MCNC: 12 MG/DL (ref 7–18)
BUN/CREAT SERPL: 18 (ref 12–20)
CA-I BLD-MCNC: 9.1 MG/DL (ref 8.5–10.1)
CHLORIDE SERPL-SCNC: 107 MMOL/L (ref 100–111)
CO2 SERPL-SCNC: 24 MMOL/L (ref 21–32)
CREAT SERPL-MCNC: 0.66 MG/DL (ref 0.6–1.3)
DIFFERENTIAL METHOD BLD: ABNORMAL
EOSINOPHIL # BLD: 0 K/UL (ref 0–0.4)
EOSINOPHIL NFR BLD: 0 % (ref 0–5)
ERYTHROCYTE [DISTWIDTH] IN BLOOD BY AUTOMATED COUNT: 12.8 % (ref 11.6–14.5)
GLOBULIN SER CALC-MCNC: 3.8 G/DL (ref 2–4)
GLUCOSE SERPL-MCNC: 102 MG/DL (ref 74–99)
HCT VFR BLD AUTO: 36.4 % (ref 35–45)
HGB BLD-MCNC: 12.2 G/DL (ref 12–16)
IMM GRANULOCYTES # BLD AUTO: 0 K/UL
IMM GRANULOCYTES NFR BLD AUTO: 0 %
LYMPHOCYTES # BLD: 0.6 K/UL (ref 0.9–3.6)
LYMPHOCYTES NFR BLD: 2 % (ref 21–52)
MCH RBC QN AUTO: 30.4 PG (ref 24–34)
MCHC RBC AUTO-ENTMCNC: 33.5 G/DL (ref 31–37)
MCV RBC AUTO: 90.8 FL (ref 78–100)
MONOCYTES # BLD: 2.1 K/UL (ref 0.05–1.2)
MONOCYTES NFR BLD: 7 % (ref 3–10)
NEUTS SEG # BLD: 26.8 K/UL (ref 1.8–8)
NEUTS SEG NFR BLD: 91 % (ref 40–73)
NRBC # BLD: 0 K/UL (ref 0–0.01)
NRBC BLD-RTO: 0 PER 100 WBC
PLATELET # BLD AUTO: 244 K/UL (ref 135–420)
PMV BLD AUTO: 11.7 FL (ref 9.2–11.8)
POTASSIUM SERPL-SCNC: 3.6 MMOL/L (ref 3.5–5.5)
PROT SERPL-MCNC: 7.4 G/DL (ref 6.4–8.2)
RBC # BLD AUTO: 4.01 M/UL (ref 4.2–5.3)
RBC MORPH BLD: ABNORMAL
SODIUM SERPL-SCNC: 138 MMOL/L (ref 136–145)
TROPONIN I SERPL HS-MCNC: <3 NG/L (ref 0–54)
WBC # BLD AUTO: 29.5 K/UL (ref 4.6–13.2)

## 2024-12-11 PROCEDURE — 80053 COMPREHEN METABOLIC PANEL: CPT

## 2024-12-11 PROCEDURE — 85025 COMPLETE CBC W/AUTO DIFF WBC: CPT

## 2024-12-11 PROCEDURE — 84484 ASSAY OF TROPONIN QUANT: CPT

## 2024-12-11 PROCEDURE — 99284 EMERGENCY DEPT VISIT MOD MDM: CPT

## 2024-12-11 PROCEDURE — 6370000000 HC RX 637 (ALT 250 FOR IP): Performed by: EMERGENCY MEDICINE

## 2024-12-11 PROCEDURE — 71046 X-RAY EXAM CHEST 2 VIEWS: CPT

## 2024-12-11 RX ORDER — ONDANSETRON 4 MG/1
4 TABLET, ORALLY DISINTEGRATING ORAL 3 TIMES DAILY PRN
Qty: 21 TABLET | Refills: 0 | Status: SHIPPED | OUTPATIENT
Start: 2024-12-11

## 2024-12-11 RX ORDER — CYCLOBENZAPRINE HCL 5 MG
5 TABLET ORAL 3 TIMES DAILY PRN
Qty: 20 TABLET | Refills: 0 | Status: SHIPPED | OUTPATIENT
Start: 2024-12-11 | End: 2024-12-18

## 2024-12-11 RX ORDER — LIDOCAINE 4 G/G
1 PATCH TOPICAL
Status: DISCONTINUED | OUTPATIENT
Start: 2024-12-11 | End: 2024-12-11 | Stop reason: HOSPADM

## 2024-12-11 RX ORDER — ACETAMINOPHEN 500 MG
1000 TABLET ORAL 4 TIMES DAILY PRN
Qty: 30 TABLET | Refills: 0 | Status: SHIPPED | OUTPATIENT
Start: 2024-12-11

## 2024-12-11 RX ORDER — OXYCODONE HYDROCHLORIDE 5 MG/1
5 TABLET ORAL EVERY 6 HOURS PRN
Qty: 8 TABLET | Refills: 0 | Status: SHIPPED | OUTPATIENT
Start: 2024-12-11 | End: 2024-12-14

## 2024-12-11 RX ORDER — OXYCODONE HYDROCHLORIDE 10 MG/1
5 TABLET ORAL
Status: COMPLETED | OUTPATIENT
Start: 2024-12-11 | End: 2024-12-11

## 2024-12-11 RX ORDER — IBUPROFEN 600 MG/1
600 TABLET, FILM COATED ORAL EVERY 6 HOURS PRN
Qty: 28 TABLET | Refills: 0 | Status: SHIPPED | OUTPATIENT
Start: 2024-12-11 | End: 2024-12-18

## 2024-12-11 RX ORDER — OXYCODONE AND ACETAMINOPHEN 5; 325 MG/1; MG/1
1 TABLET ORAL
Status: DISCONTINUED | OUTPATIENT
Start: 2024-12-11 | End: 2024-12-11

## 2024-12-11 RX ORDER — CEPHALEXIN 500 MG/1
500 CAPSULE ORAL 2 TIMES DAILY
Qty: 14 CAPSULE | Refills: 0 | Status: SHIPPED | OUTPATIENT
Start: 2024-12-11 | End: 2024-12-18

## 2024-12-11 RX ORDER — ACETAMINOPHEN 325 MG/1
650 TABLET ORAL
Status: COMPLETED | OUTPATIENT
Start: 2024-12-11 | End: 2024-12-11

## 2024-12-11 RX ORDER — IBUPROFEN 600 MG/1
600 TABLET, FILM COATED ORAL
Status: COMPLETED | OUTPATIENT
Start: 2024-12-11 | End: 2024-12-11

## 2024-12-11 RX ORDER — LIDOCAINE 4 G/G
1 PATCH TOPICAL 2 TIMES DAILY PRN
Qty: 14 EACH | Refills: 0 | Status: SHIPPED | OUTPATIENT
Start: 2024-12-11

## 2024-12-11 RX ADMIN — OXYCODONE HYDROCHLORIDE 5 MG: 10 TABLET ORAL at 12:32

## 2024-12-11 RX ADMIN — IBUPROFEN 600 MG: 600 TABLET, FILM COATED ORAL at 11:36

## 2024-12-11 RX ADMIN — ACETAMINOPHEN 325MG 650 MG: 325 TABLET ORAL at 11:36

## 2024-12-11 ASSESSMENT — PAIN - FUNCTIONAL ASSESSMENT
PAIN_FUNCTIONAL_ASSESSMENT: 0-10
PAIN_FUNCTIONAL_ASSESSMENT: 0-10

## 2024-12-11 ASSESSMENT — PAIN SCALES - GENERAL
PAINLEVEL_OUTOF10: 9
PAINLEVEL_OUTOF10: 6

## 2024-12-11 ASSESSMENT — PAIN DESCRIPTION - LOCATION
LOCATION: BACK;RIB CAGE
LOCATION: BACK

## 2024-12-11 ASSESSMENT — LIFESTYLE VARIABLES
HOW OFTEN DO YOU HAVE A DRINK CONTAINING ALCOHOL: NEVER
HOW MANY STANDARD DRINKS CONTAINING ALCOHOL DO YOU HAVE ON A TYPICAL DAY: PATIENT DOES NOT DRINK

## 2024-12-11 NOTE — DISCHARGE INSTRUCTIONS
Thank you for choosing our Emergency Department for your care.  It is our privilege to care for you in your time of need.  In the next several days, you may receive a survey via email or mailed to your home about your experience with our team.  We would greatly appreciate you taking a few minutes to complete the survey, as we use this information to learn what we have done well and what we could be doing better. Thank you for trusting us with your care!    Below you will find a list of your tests from today's visit.   Labs and Radiology Studies  Recent Results (from the past 12 hour(s))   CBC with Auto Differential    Collection Time: 12/11/24 11:17 AM   Result Value Ref Range    WBC 29.5 (H) 4.6 - 13.2 K/uL    RBC 4.01 (L) 4.20 - 5.30 M/uL    Hemoglobin 12.2 12.0 - 16.0 g/dL    Hematocrit 36.4 35.0 - 45.0 %    MCV 90.8 78.0 - 100.0 FL    MCH 30.4 24.0 - 34.0 PG    MCHC 33.5 31.0 - 37.0 g/dL    RDW 12.8 11.6 - 14.5 %    Platelets 244 135 - 420 K/uL    MPV 11.7 9.2 - 11.8 FL    Nucleated RBCs 0.0 0.0  WBC    nRBC 0.00 0.00 - 0.01 K/uL    Neutrophils % 91 (H) 40 - 73 %    Lymphocytes % 2 (L) 21 - 52 %    Monocytes % 7 3 - 10 %    Eosinophils % 0 0 - 5 %    Basophils % 0 0 - 2 %    Immature Granulocytes % 0 %    Neutrophils Absolute 26.8 (H) 1.8 - 8.0 K/UL    Lymphocytes Absolute 0.6 (L) 0.9 - 3.6 K/UL    Monocytes Absolute 2.1 (H) 0.05 - 1.2 K/UL    Eosinophils Absolute 0.0 0.0 - 0.4 K/UL    Basophils Absolute 0.0 0.0 - 0.1 K/UL    Immature Granulocytes Absolute 0.0 K/UL    Differential Type Manual      RBC Comment Normocytic, Normochromic     CMP    Collection Time: 12/11/24 11:17 AM   Result Value Ref Range    Sodium 138 136 - 145 mmol/L    Potassium 3.6 3.5 - 5.5 mmol/L    Chloride 107 100 - 111 mmol/L    CO2 24 21 - 32 mmol/L    Anion Gap 7 3.0 - 18.0 mmol/L    Glucose 102 (H) 74 - 99 mg/dL    BUN 12 7 - 18 mg/dL    Creatinine 0.66 0.60 - 1.30 mg/dL    BUN/Creatinine Ratio 18 12 - 20      Est, Glom Filt

## 2024-12-11 NOTE — ED TRIAGE NOTES
Has a Hx of Hemoptysis  for several years, yesterday she had a Bronchoscopy. And last night she started having a lot of pain in her ribs back and was vomiting

## 2024-12-11 NOTE — ED PROVIDER NOTES
findings present at this time, the chance of SEA or SCC is so remote that additional testing or imaging is more likely to harm the patient than diagnose SEA. [id number]    SHARED DECISION MAKING:   I discussed my risk assessment with the patient. The patient understands and consents to the risk of disposition/plan, as well as the risk of uncertainty in estimating outcomes. [id number]          Patient was given the following medications:  Medications   acetaminophen (TYLENOL) tablet 650 mg (650 mg Oral Given 12/11/24 1136)   ibuprofen (ADVIL;MOTRIN) tablet 600 mg (600 mg Oral Given 12/11/24 1136)   oxyCODONE HCl (OXY-IR) immediate release tablet 5 mg (5 mg Oral Given 12/11/24 1232)       CONSULTS: See ED Course/MDM for further details.  None       PROCEDURES   Unless otherwise noted above, none  Procedures      CRITICAL CARE TIME   Patient does not meet Critical Care Time, 0 minutes    ED IMPRESSION     1. Acute bilateral thoracic back pain          DISPOSITION/PLAN   DISPOSITION Decision To Discharge 12/11/2024 12:26:21 PM   DISPOSITION CONDITION Stable         Discharge Note: The patient is stable for discharge home. The signs, symptoms, diagnosis, and discharge instructions have been discussed, understanding conveyed, and agreed upon. The patient is to follow up as recommended or return to ER should their symptoms worsen.      PATIENT REFERRED TO:  Jess Gaytan MD  [address]  [place] [id number] [phone]    In 3 days  for followup    [facility] EMERGENCY DEPT  [address] [place] [address]  [phone]            DISCHARGE MEDICATIONS:     Medication List        START taking these medications      acetaminophen 500 MG tablet  Commonly known as: TYLENOL  Take 2 tablets by mouth 4 times daily as needed for Pain     cephALEXin 500 MG capsule  Commonly known as: KEFLEX  Take 1 capsule by mouth 2 times daily for 7 days     cyclobenzaprine 5 MG tablet  Commonly known as:

## 2025-05-05 ENCOUNTER — HOSPITAL ENCOUNTER (EMERGENCY)
Age: 40
Discharge: HOME | End: 2025-05-05
Attending: STUDENT IN AN ORGANIZED HEALTH CARE EDUCATION/TRAINING PROGRAM
Payer: MEDICAID

## 2025-05-05 VITALS
WEIGHT: 120 LBS | DIASTOLIC BLOOD PRESSURE: 72 MMHG | SYSTOLIC BLOOD PRESSURE: 118 MMHG | HEART RATE: 70 BPM | RESPIRATION RATE: 18 BRPM | BODY MASS INDEX: 19.99 KG/M2 | OXYGEN SATURATION: 100 % | TEMPERATURE: 98.6 F | HEIGHT: 65 IN
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
## 2025-05-05 DIAGNOSIS — L03.115 CELLULITIS OF RIGHT LOWER EXTREMITY: ICD-10-CM

## 2025-05-05 DIAGNOSIS — S80.861A INSECT BITE OF RIGHT LOWER LEG, INITIAL ENCOUNTER: Primary | ICD-10-CM

## 2025-05-05 PROCEDURE — 99283 EMERGENCY DEPT VISIT LOW MDM: CPT

## 2025-05-05 RX ORDER — CEPHALEXIN 500 MG/1
500 CAPSULE ORAL 4 TIMES DAILY
Qty: 28 CAPSULE | Refills: 0 | Status: SHIPPED | OUTPATIENT
Start: 2025-05-05 | End: 2025-05-12

## 2025-05-05 ASSESSMENT — PAIN DESCRIPTION - ORIENTATION: ORIENTATION: RIGHT

## 2025-05-05 ASSESSMENT — PAIN SCALES - GENERAL: PAINLEVEL_OUTOF10: 4

## 2025-05-05 ASSESSMENT — PAIN DESCRIPTION - LOCATION: LOCATION: ANKLE

## 2025-05-05 ASSESSMENT — PAIN - FUNCTIONAL ASSESSMENT: PAIN_FUNCTIONAL_ASSESSMENT: 0-10

## 2025-05-05 NOTE — ED PROVIDER NOTES
[facility] EMERGENCY DEPARTMENT  EMERGENCY DEPARTMENT ENCOUNTER      Pt Name: Patti Dong  MRN: [id number]  Birthdate 1985  Date of evaluation: 5/5/2025  Provider: Alonso López MD   PCP: Jess Gaytan MD  Note Started: 1:40 PM 5/5/25     CHIEF COMPLAINT       Chief Complaint   Patient presents with    Insect Bite        HISTORY OF PRESENT ILLNESS: 1 or more elements      History From: Patient  History limited by: Nothing     Patti Dong is a 39 y.o. female who presents for evaluation of redness to her right ankle.  Noted an area of redness and ulceration to her right ankle.  This began 2 days ago.  Reports a tick bite at that time.  No fevers, weakness, numbness, chest pain, shortness of breath     Nursing Notes were all reviewed and agreed with or any disagreements were addressed in the HPI.     REVIEW OF SYSTEMS      Review of Systems   Musculoskeletal:         Wound to right ankle   All other systems reviewed and are negative.       Positives and Pertinent negatives as per HPI.    PAST HISTORY     Past Medical History:  Past Medical History:   Diagnosis Date    Abnormal Papanicolaou smear of cervix 2011    cleared in [employer]    Anemia        Past Surgical History:  Past Surgical History:   Procedure Laterality Date    HEENT      wisdom teeth removal    HEENT      tonsils    HYSTERECTOMY, TOTAL ABDOMINAL (CERVIX REMOVED)  February 2020    Parital, ovaries still remain    OTHER SURGICAL HISTORY  2005    Tonsillectomy       Family History:  Family History   Problem Relation Age of Onset    Ovarian Cancer Mother     Heart Attack Mother     Cancer Maternal Grandmother        Social History:  Social History     Tobacco Use    Smoking status: Never    Smokeless tobacco: Never   Vaping Use    Vaping status: Never Used   Substance Use Topics    Alcohol use: Not Currently     Comment: socially    Drug use: No       Allergies:  No Known Allergies    CURRENT MEDICATIONS      Previous

## 2025-05-05 NOTE — ED TRIAGE NOTES
Pt found a tick on the inside of her right ankle, removed tick and cleaned the area and now is having some swelling and drainage.

## 2025-07-03 ENCOUNTER — OFFICE VISIT (OUTPATIENT)
Age: 40
End: 2025-07-03
Payer: MEDICAID

## 2025-07-03 VITALS — WEIGHT: 120 LBS | HEIGHT: 65 IN | BODY MASS INDEX: 19.99 KG/M2

## 2025-07-03 DIAGNOSIS — M25.561 RIGHT KNEE PAIN, UNSPECIFIED CHRONICITY: Primary | ICD-10-CM

## 2025-07-03 PROCEDURE — 99203 OFFICE O/P NEW LOW 30 MIN: CPT | Performed by: ORTHOPAEDIC SURGERY

## 2025-07-03 NOTE — PROGRESS NOTES
Name: Patti Dong    : 1985     [facility]OP[facility]ICINE  [address]  [place] [address]  Dept: [phone]  Dept Fax: [phone]   Chief Complaint   Patient presents with    Knee Pain     right     Ht 1.651 m (5' 5\")   Wt 54.4 kg (120 lb)   BMI 19.97 kg/m²    No Known Allergies  Current Outpatient Medications   Medication Sig Dispense Refill    ibuprofen (ADVIL;MOTRIN) 600 MG tablet Take 1 tablet by mouth every 6 hours as needed for Pain 28 tablet 0    acetaminophen (TYLENOL) 500 MG tablet Take 2 tablets by mouth 4 times daily as needed for Pain (Patient not taking: Reported on 2025) 30 tablet 0    lidocaine 4 % external patch Place 1 patch onto the skin 2 times daily as needed (for pain) (Patient not taking: Reported on 2025) 14 each 0    ondansetron (ZOFRAN-ODT) 4 MG disintegrating tablet Take 1 tablet by mouth 3 times daily as needed for Nausea or Vomiting (Patient not taking: Reported on 2025) 21 tablet 0     No current facility-administered medications for this visit.       Patient Active Problem List   Diagnosis    S/P total hysterectomy    Primary osteoarthritis of right knee    Exposure to potentially hazardous substance    History of cervical cancer      Family History   Problem Relation Age of Onset    Ovarian Cancer Mother     Heart Attack Mother     Unknown Mother         Ovarian cancer    Cancer Maternal Grandmother        Past Surgical History:   Procedure Laterality Date    HEENT      wisdom teeth removal    HEENT      tonsils    HYSTERECTOMY, TOTAL ABDOMINAL (CERVIX REMOVED)  2020    Parital, ovaries still remain    KNEE ARTHROSCOPY      OTHER SURGICAL HISTORY  2005    Tonsillectomy      Past Medical History:   Diagnosis Date    Abnormal Papanicolaou smear of cervix     cleared in [employer]    Anemia     Bursitis 2007    Hip    Cancer (HCC)     Partial

## 2025-07-17 ENCOUNTER — HOSPITAL ENCOUNTER (OUTPATIENT)
Age: 40
Discharge: HOME | End: 2025-07-20
Attending: ORTHOPAEDIC SURGERY
Payer: MEDICAID

## 2025-07-17 DIAGNOSIS — M25.561 RIGHT KNEE PAIN, UNSPECIFIED CHRONICITY: ICD-10-CM

## 2025-07-17 PROCEDURE — 73721 MRI JNT OF LWR EXTRE W/O DYE: CPT
